# Patient Record
Sex: MALE | Race: BLACK OR AFRICAN AMERICAN | NOT HISPANIC OR LATINO | URBAN - METROPOLITAN AREA
[De-identification: names, ages, dates, MRNs, and addresses within clinical notes are randomized per-mention and may not be internally consistent; named-entity substitution may affect disease eponyms.]

---

## 2017-02-08 ENCOUNTER — IMPORTED ENCOUNTER (OUTPATIENT)
Dept: URBAN - METROPOLITAN AREA CLINIC 31 | Facility: CLINIC | Age: 32
End: 2017-02-08

## 2017-02-08 PROBLEM — H16.263: Noted: 2017-02-08

## 2017-02-08 PROCEDURE — 92014 COMPRE OPH EXAM EST PT 1/>: CPT

## 2017-02-08 PROCEDURE — 92015 DETERMINE REFRACTIVE STATE: CPT

## 2017-02-08 NOTE — PATIENT DISCUSSION
1.  A type of severe allergic conjunctivits treated with topical antihistamine and steroid drops. 2. Refractive error Glasses change optional. 3.  Return for an appointment in 12 months for comprehensive exam. with Dr. Kathy Carlson.

## 2017-06-26 NOTE — PATIENT DISCUSSION
Diabetes without Retinopathy Counseling: I have discussed with the patient the importance of controlling blood glucose, blood pressure and lipid levels levels to minimize the risk of developing retinal disease from diabetes. Explained the importance of annual dilated eye exams because effective treatment for diabetic retinopathy depends on timely intervention. The patient was instructed to call or return sooner if they noticed blurred or distorted vision, fluctuating vision, pain or redness around one or both eyes. Return for follow-up as scheduled.

## 2017-06-26 NOTE — PATIENT DISCUSSION
Cataract Counseling: I have discussed continuing with current spectacles vs updating. I have offered the patient a new spectacle prescription to fill if desires. Return to follow up as scheduled or sooner if symptoms arise.

## 2017-08-11 ENCOUNTER — IMPORTED ENCOUNTER (OUTPATIENT)
Dept: URBAN - METROPOLITAN AREA CLINIC 31 | Facility: CLINIC | Age: 32
End: 2017-08-11

## 2017-08-11 NOTE — PATIENT DISCUSSION
Refractive error Insertion removal and care regimen successfully completed today. and Dispense trial contacts OU. To discontinue and call if any problems.

## 2017-09-06 ENCOUNTER — IMPORTED ENCOUNTER (OUTPATIENT)
Dept: URBAN - METROPOLITAN AREA CLINIC 31 | Facility: CLINIC | Age: 32
End: 2017-09-06

## 2017-09-06 PROBLEM — H16.263: Noted: 2017-09-06

## 2017-09-06 PROBLEM — H10.403: Noted: 2017-09-06

## 2017-09-06 PROCEDURE — 92015 DETERMINE REFRACTIVE STATE: CPT

## 2017-09-06 PROCEDURE — 92014 COMPRE OPH EXAM EST PT 1/>: CPT

## 2017-09-06 PROCEDURE — 92310 CONTACT LENS FITTING OU: CPT

## 2017-09-06 NOTE — PATIENT DISCUSSION
1.  1.  Refractive error Annual Good ocular health documented. Discussed options of glasses contacts or refractive surgery. Discussed importance of annual eye exams. Daily disposable only. 2.  A type of severe allergic conjunctivits treated with topical antihistamine and steroid drops. Patient seeing an allergist.2. Daily disposable only Part time for work. Reccomend [facility].

## 2017-09-25 ENCOUNTER — IMPORTED ENCOUNTER (OUTPATIENT)
Dept: URBAN - METROPOLITAN AREA CLINIC 31 | Facility: CLINIC | Age: 32
End: 2017-09-25

## 2017-09-25 PROCEDURE — V2520 CONTACT LENS HYDROPHILIC: HCPCS

## 2017-09-25 NOTE — PATIENT DISCUSSION
Refractive error Continue present contact lens modality. Stop/wear and call if any redness pain or decrease in vision occur. Continue present contact lens modality. Stop/wear and call if any redness pain or decrease in vision occur.

## 2018-10-22 ENCOUNTER — IMPORTED ENCOUNTER (OUTPATIENT)
Dept: URBAN - METROPOLITAN AREA CLINIC 31 | Facility: CLINIC | Age: 33
End: 2018-10-22

## 2018-10-22 PROBLEM — H16.263: Noted: 2018-10-22

## 2018-10-22 PROBLEM — Z79.899: Noted: 2018-10-22

## 2018-10-22 PROBLEM — M32.9: Noted: 2018-10-22

## 2018-10-22 PROCEDURE — 92014 COMPRE OPH EXAM EST PT 1/>: CPT

## 2018-10-22 PROCEDURE — 92015 DETERMINE REFRACTIVE STATE: CPT

## 2018-10-22 NOTE — PATIENT DISCUSSION
1.  Vernal/GPC relatively quiet now with allergy Tx. Steroid drop used prn.2. Plaquenil- Pt currently on oral plaquenil therapy. No signs of retinopathy based on todays exam.  Continue to follow with prescribing medical professional.3. Myopia Rx glasses.

## 2018-10-22 NOTE — PATIENT DISCUSSION
Plaquenil- Pt currently on oral plaquenil therapy. No signs of retinopathy based on todays dilated exam and Visual Field.   Continue to follow with prescribing medical professional.

## 2020-01-16 ENCOUNTER — IMPORTED ENCOUNTER (OUTPATIENT)
Dept: URBAN - METROPOLITAN AREA CLINIC 31 | Facility: CLINIC | Age: 35
End: 2020-01-16

## 2020-01-16 PROBLEM — H16.263: Noted: 2020-01-16

## 2020-01-16 PROCEDURE — 92014 COMPRE OPH EXAM EST PT 1/>: CPT

## 2020-01-16 NOTE — PATIENT DISCUSSION
1.  Refractive error Annual Rx spectacles2. A type of severe allergic conjunctivits treated with topical antihistamine and steroid drops.

## 2020-03-10 ENCOUNTER — APPOINTMENT (RX ONLY)
Dept: URBAN - METROPOLITAN AREA CLINIC 121 | Facility: CLINIC | Age: 35
Setting detail: DERMATOLOGY
End: 2020-03-10

## 2020-03-10 DIAGNOSIS — L73.0 ACNE KELOID: ICD-10-CM

## 2020-03-10 PROCEDURE — 99202 OFFICE O/P NEW SF 15 MIN: CPT

## 2020-03-10 PROCEDURE — ? FOLLOW UP FOR NEXT VISIT

## 2020-03-10 PROCEDURE — ? PRESCRIPTION

## 2020-03-10 RX ORDER — UREA 40 %
1 CREAM (GRAM) TOPICAL QD
Qty: 1 | Refills: 1 | Status: ERX | COMMUNITY
Start: 2020-03-10

## 2020-03-10 RX ADMIN — Medication 1: at 00:00

## 2020-03-10 ASSESSMENT — LOCATION ZONE DERM: LOCATION ZONE: SCALP

## 2020-03-10 ASSESSMENT — LOCATION SIMPLE DESCRIPTION DERM: LOCATION SIMPLE: HAIR

## 2020-03-10 ASSESSMENT — LOCATION DETAILED DESCRIPTION DERM: LOCATION DETAILED: HAIR

## 2022-04-01 ASSESSMENT — VISUAL ACUITY
OS_CC: J1+14''
OS_SC: 20/20-1
OS_SC: 20/20
OD_SC: 20/25
OD_CC: J1+14''
OD_CC: J1+14''
OD_SC: 20/25+2
OS_SC: 20/20
OD_SC: 20/20-3
OS_CC: J1+14''

## 2022-04-01 ASSESSMENT — TONOMETRY
OD_IOP_MMHG: 13
OS_IOP_MMHG: 18
OD_IOP_MMHG: 16
OD_IOP_MMHG: 18
OS_IOP_MMHG: 18
OS_IOP_MMHG: 13
OS_IOP_MMHG: 14
OD_IOP_MMHG: 14